# Patient Record
(demographics unavailable — no encounter records)

---

## 2017-01-01 NOTE — DIAGNOSTIC IMAGING REPORT
Indication: PAIN



Technique: One view of the abdomen



Comparison: None



Findings: Bowel gas pattern is unremarkable. No unusual masses or calcifications.

The included lung bases are clear



Impression: Negative

## 2017-01-01 NOTE — EMERGENCY ROOM REPORT
History of Present Illness


General


Chief Complaint:  New Born Assessment


Source:  Family Member





Present Illness


HPI


This is a 26 day old baby boy presents with crying.  His been ongoing for about 

a week but worse in today.  Mom try over-the-counter cough medication without 

much relief.  Also switch formula.  Child's appear to cry and then pass gas and 

felt better.  Only go about twice a day bowel movement..  No vomiting.  Eating 

weight.  No fever or chills.  No nausea vomiting.


Allergies:  


Coded Allergies:  


     No Known Allergies (Unverified , 10/27/17)





Patient History


Past Medical History:  see triage record, old chart reviewed


Past Surgical History:  none


Pertinent Family History:  no significant inherited disorders


Social History:  none


Immunizations:  UTD


Reviewed Nursing Documentation:  PMH: Agreed, PSxH: Agreed





Review of Systems


Constitutional:  Denies: fevers


Eye:  Denies: redness


ENT:  Denies: earache, congestion, sore throat


Respiratory:  Denies: cough


Cardiovascular:  Denies: chest pain


Gastrointestinal:  Denies: pain, nausea, vomiting, diarrhea


Skin:  Denies: rash


All Other Systems:  negative except mentioned in HPI





Physical Exam


Physical Exam





Vital Signs








  Date Time  Temp Pulse Resp B/P (MAP) Pulse Ox O2 Delivery O2 Flow Rate FiO2


 


10/27/17 22:12 98.2       





vitals normal


Sp02 EP Interpretation:  reviewed, normal


General Appearance:  no apparent distress, alert, non-toxic, active/playful/

smiles, normal attentiveness for age


Head:  normocephalic, atraumatic


Eyes:  bilateral eye PERRL, bilateral eye EOMI


ENT:  TMs + canals normal, nasal exam normal, oropharynx normal


Neck:  neck supple, symmetric, no masses, full ROM without pain


Respiratory:  effort normal, no rhonchi, no wheezing, no retractions


Cardiovascular:  RRR, no murmur, gallop, rub


Gastrointestinal:  non tender, no mass, non-distended, normal bowel sounds


Musculoskeletal:  normal ROM, strength & tone normal


Neurologic:  motor strength/tone normal


Skin:  no petechiae, no rash


Lymphatic:  normal cervical nodes





Medical Decision Making


Diagnostic Impression:  


 Primary Impression:  


 Colic in infants


ER Course


Child presents with symptoms consistent with colic.  He sleeping comfortably.  

I watched him feed and he was taking the bottle without a problem.  Does make 

noises with suction.  Advised mom to change bottle or nipple.  I see no 

evidence of meningitis or acute abdomen.  No evidence of intussusception.  No 

evidence of torsion.  No evidence of hair tourniquet syndrome.





Last Vital Signs








  Date Time  Temp Pulse Resp B/P (MAP) Pulse Ox O2 Delivery O2 Flow Rate FiO2


 


10/27/17 22:12 98.2       








Status:  improved


Disposition:  HOME, SELF-CARE


Condition:  Stable





Additional Instructions:  


Continue with colic medication.  Try changing bottle and nipple.  Followup with 

your Dr. in 2 to 3 days for recheck.  Return if worse.











AMIE RUDD M.D. Oct 27, 2017 23:07

## 2017-01-01 NOTE — EMERGENCY ROOM REPORT
History of Present Illness


General


Chief Complaint:  General Complaint


Source:  Family Member





Present Illness


HPI


The patient is a 5-week-old male brought in by mom after increased crying.  The 

patient been seen multiple times for similar type symptoms.  Patient had prior 

history of normal birth.  Patient was noted to have no vomiting.  He had been 

urinating normally.  The patient had no black or bloody stools.  The patient 

had been eating well.  Mom noticed a large amount of flatus.  The onset 

occurred approximately 3 weeks ago with intermittent episodes.Patient had been 

able to have normal bowel movements which are yellow in color.  Patient been 

wetting diapers normally.The mom had initially been breast-feeding however she 

discontinued after patient had been having increased symptoms the patient 

currently bottle-fed and had been feeding approximately 2 and 4 ounces per 

feeding.


Allergies:  


Coded Allergies:  


     No Known Allergies (Unverified , 10/27/17)





Patient History


Past Medical History:  see triage record


Reviewed Nursing Documentation:  PMH: Agreed, PSxH: Agreed





Nursing Documentation-PMH


Past Medical History:  No Stated History





Review of Systems


All Other Systems:  negative except mentioned in HPI





Physical Exam


Physical Exam





Vital Signs








  Date Time  Temp Pulse Resp B/P (MAP) Pulse Ox O2 Delivery O2 Flow Rate FiO2


 


11/13/17 20:59 97.5       








Sp02 EP Interpretation:  reviewed, normal


General Appearance:  no apparent distress, alert, non-toxic, normal 

attentiveness for age, normal consolability


Eyes:  bilateral eye normal inspection, bilateral eye PERRL


ENT:  TMs + canals normal, moist mucus membranes, no angioedema, no exudates, 

no erythma, other - thrush


Respiratory:  effort normal, no rhonchi, no wheezing, no retractions, chest 

symmetric, speaking in full sentences


Gastrointestinal:  normal inspection, non tender, no mass, non-distended


Genitourinary:  normal inspection, testes descended


Musculoskeletal:  normal inspection, gait & station normal


Neurologic:  normal inspection, CN II-XII intact, oriented (for age), DTRs 

symmetric


Psychiatric:  normal inspection, judgment & insight normal


Skin:  normal inspection





Medical Decision Making


Diagnostic Impression:  


 Primary Impression:  


 Colic


 Additional Impression:  


 Thrush


ER Course


Patient presented for increased crying..  Differential diagnosis included but 

was not limited to meningitis, occult bacteremia, urinary tract infection, 

viral syndrome, pharyngitis,colic, volvulus, intussusception,  otitis media.   

Patient's benign exam and does not appear to require any laboratory testing at 

this time.  patient appears to  have thrush.  X-ray imaging of the abdomen read 

by radiology showed a retained stool without evidence of obstruction.  

Abdominal ultrasound showed no intussusception.  Mom was advised to have the 

patient followed up with her primary care physician he was given prescription 

for nystatin.





Last Vital Signs








  Date Time  Temp Pulse Resp B/P (MAP) Pulse Ox O2 Delivery O2 Flow Rate FiO2


 


11/13/17 20:59 97.5       








Status:  improved


Disposition:  HOME, SELF-CARE


Condition:  Stable


Scripts


Nystatin* (NYSTATIN*) 100,000 Unit/1 Ml Oral.susp


2 DROP ORAL FOUR TIMES A DAY, #15 ML


   Swish in the mouth and retain for as long as possible (several


   minutes) before swallowing


   Prov: Amado Maciel         11/13/17











Amado Maciel Nov 13, 2017 21:51